# Patient Record
Sex: FEMALE | Race: OTHER | Employment: UNEMPLOYED | URBAN - METROPOLITAN AREA
[De-identification: names, ages, dates, MRNs, and addresses within clinical notes are randomized per-mention and may not be internally consistent; named-entity substitution may affect disease eponyms.]

---

## 2021-11-17 ENCOUNTER — HOSPITAL ENCOUNTER (OUTPATIENT)
Dept: WOUND CARE | Age: 21
Discharge: HOME | End: 2021-11-17

## 2021-11-17 VITALS
TEMPERATURE: 97.8 F | DIASTOLIC BLOOD PRESSURE: 77 MMHG | HEART RATE: 86 BPM | RESPIRATION RATE: 18 BRPM | SYSTOLIC BLOOD PRESSURE: 116 MMHG

## 2021-11-17 DIAGNOSIS — S61.219A: ICD-10-CM

## 2021-11-17 PROCEDURE — 99203 OFFICE O/P NEW LOW 30 MIN: CPT | Performed by: SURGERY

## 2021-11-17 PROCEDURE — 99213 OFFICE O/P EST LOW 20 MIN: CPT

## 2021-11-17 RX ORDER — LIDOCAINE HYDROCHLORIDE 20 MG/ML
JELLY TOPICAL ONCE
Status: CANCELLED | OUTPATIENT
Start: 2021-11-17 | End: 2021-11-17

## 2021-11-17 RX ORDER — BETAMETHASONE DIPROPIONATE 0.05 %
OINTMENT (GRAM) TOPICAL ONCE
Status: CANCELLED | OUTPATIENT
Start: 2021-11-17 | End: 2021-11-17

## 2021-11-17 RX ORDER — GENTAMICIN SULFATE 1 MG/G
OINTMENT TOPICAL ONCE
Status: CANCELLED | OUTPATIENT
Start: 2021-11-17 | End: 2021-11-17

## 2021-11-17 RX ORDER — LIDOCAINE HYDROCHLORIDE 40 MG/ML
SOLUTION TOPICAL ONCE
Status: CANCELLED | OUTPATIENT
Start: 2021-11-17 | End: 2021-11-17

## 2021-11-17 RX ORDER — LIDOCAINE 40 MG/G
CREAM TOPICAL ONCE
Status: CANCELLED | OUTPATIENT
Start: 2021-11-17 | End: 2021-11-17

## 2021-11-17 RX ORDER — GINSENG 100 MG
CAPSULE ORAL ONCE
Status: CANCELLED | OUTPATIENT
Start: 2021-11-17 | End: 2021-11-17

## 2021-11-17 RX ORDER — BACITRACIN, NEOMYCIN, POLYMYXIN B 400; 3.5; 5 [USP'U]/G; MG/G; [USP'U]/G
OINTMENT TOPICAL ONCE
Status: CANCELLED | OUTPATIENT
Start: 2021-11-17 | End: 2021-11-17

## 2021-11-17 RX ORDER — CEPHALEXIN 500 MG/1
TABLET ORAL
COMMUNITY
Start: 2021-11-12

## 2021-11-17 RX ORDER — CLOBETASOL PROPIONATE 0.5 MG/G
OINTMENT TOPICAL ONCE
Status: CANCELLED | OUTPATIENT
Start: 2021-11-17 | End: 2021-11-17

## 2021-11-17 RX ORDER — LIDOCAINE 50 MG/G
OINTMENT TOPICAL ONCE
Status: CANCELLED | OUTPATIENT
Start: 2021-11-17 | End: 2021-11-17

## 2021-11-17 RX ORDER — BACITRACIN ZINC AND POLYMYXIN B SULFATE 500; 1000 [USP'U]/G; [USP'U]/G
OINTMENT TOPICAL ONCE
Status: CANCELLED | OUTPATIENT
Start: 2021-11-17 | End: 2021-11-17

## 2021-11-17 ASSESSMENT — PAIN DESCRIPTION - PAIN TYPE: TYPE: ACUTE PAIN

## 2021-11-17 ASSESSMENT — PAIN SCALES - GENERAL: PAINLEVEL_OUTOF10: 8

## 2021-11-17 ASSESSMENT — PAIN DESCRIPTION - ORIENTATION: ORIENTATION: LEFT

## 2021-11-17 ASSESSMENT — PAIN DESCRIPTION - LOCATION: LOCATION: FINGER (COMMENT WHICH ONE)

## 2021-11-17 ASSESSMENT — PAIN DESCRIPTION - DESCRIPTORS: DESCRIPTORS: BURNING

## 2021-11-17 NOTE — PLAN OF CARE
Problem: Pain:  Goal: Pain level will decrease  Outcome: Ongoing  Goal: Control of acute pain  Outcome: Ongoing  Goal: Control of chronic pain  Outcome: Ongoing     Problem: Wound:  Goal: Will show signs of wound healing; wound closure and no evidence of infection  Outcome: Ongoing

## 2021-11-17 NOTE — PROGRESS NOTES
Cassandra Rodas [address]                                                   Progress Note and Procedure Note      Wanda Thurston  MEDICAL RECORD NUMBER:  [id number]  AGE: 22 y.o. GENDER: female  : 2000  EPISODE DATE:  2021    Subjective:     Chief Complaint   Patient presents with    Wound Check     left fifth finger wound         HISTORY of PRESENT ILLNESS HPI     Wanda Thurston is a 22 y.o. female who presents today for wound/ulcer evaluation. History of Wound Context: Patient had lacerated her left 5th finger at home on . Patient seen at urgent care. No fever or chills. Patient with difficulty moving middle and distal phalanx due to pain. Wound/Ulcer Pain Timing/Severity: constant  Quality of pain: aching  Severity:  7 / 10   Modifying Factors: Pain worsens with movement  Associated Signs/Symptoms: none    Ulcer Identification:  Ulcer Type: traumatic  Contributing Factors: none    Wound: Laceration        PAST MEDICAL HISTORY    History reviewed. No pertinent past medical history. PAST SURGICAL HISTORY    Past Surgical History:   Procedure Laterality Date    TONSILLECTOMY         FAMILY HISTORY    History reviewed. No pertinent family history. SOCIAL HISTORY    Social History     Tobacco Use    Smoking status: Never Smoker    Smokeless tobacco: Never Used   Vaping Use    Vaping Use: Never used   Substance Use Topics    Alcohol use: Never    Drug use: Never       ALLERGIES    No Known Allergies    MEDICATIONS    Current Outpatient Medications on File Prior to Encounter   Medication Sig Dispense Refill    Cephalexin 500 MG TABS Take by mouth       No current facility-administered medications on file prior to encounter.        REVIEW OF SYSTEMS    Constitutional: negative  Respiratory: negative  Cardiovascular: negative  Allergic/Immunologic: negative    Objective:      /77   Pulse 86   Temp 97.8 °F (36.6 °C) (Temporal)   Resp 18     Wt Readings from Last 3 Encounters:   No data found for Wt       PHYSICAL EXAM    Constitutional:   Well nourished and well developed. Appears neat and clean. Patient is alert, oriented x3, and in no apparent distress. Respiratory:  Respiratory effort is easy and symmetric bilaterally. Rate is normal at rest and on room air. Vascular: 2+ left radial.    Dermatological:  Wound description noted in wound assessment. No tendon exposed. Psychiatric:  Judgement and insight intact. Short and long term memory intact. No evidence of depression, anxiety, or agitation. Patient is calm, cooperative, and communicative. Appropriate interactions and affect. Assessment:      Active Hospital Problems    Diagnosis Date Noted    Laceration of finger, left, complicated, initial encounter Tasia Cordoba 11/17/2021      Wound/Ulcer Descriptions are Pre Debridement except measurements:    Wound 11/17/21 Finger (Comment which one) Left #1  (Active)   Wound Image   11/17/21 0957   Wound Etiology Traumatic 11/17/21 0957   Wound Cleansed Cleansed with saline 11/17/21 0957   Dressing/Treatment Xeroform; Dry dressing 11/17/21 1017   Wound Length (cm) 3 cm 11/17/21 0957   Wound Width (cm) 0.5 cm 11/17/21 0957   Wound Depth (cm) 0.2 cm 11/17/21 0957   Wound Surface Area (cm^2) 1.5 cm^2 11/17/21 0957   Wound Volume (cm^3) 0.3 cm^3 11/17/21 0957   Wound Assessment Pink/red 11/17/21 0957   Drainage Amount Small 11/17/21 0957   Drainage Description Sanguinous 11/17/21 0957   Odor None 11/17/21 0957   Patricia-wound Assessment Intact 11/17/21 0957   Margins Defined edges 11/17/21 0957   Wound Thickness Description not for Pressure Injury Full thickness 11/17/21 0957   Number of days: 0              Plan:     Dressing changes as below. Patient understands that he needs to exercise her 5th digit to prevent developing a contracture.       Treatment Note please see attached Discharge Instructions    Written patient dismissal instructions given to patient and signed by patient or POA. Discharge [address] and [facility]   Physician Orders and Discharge Instructions  [address]  Telephone: [phone]      [facility] [phone]      NAME:  Wanda Thurston  YOB: 2000  MEDICAL RECORD NUMBER:  [id number]    Home Care/Facility: None    Wound Location: Left Fifth Finger    Dressing orders:Cleanse with mild soap and water  1. Apply Xeroform to wound bed  2. Cover with dry dressing  3. Change Daily    Compression:    Offloading Device:    Other Instructions: Flex and Bend finger as much as possible. Watch for signs of Infection (increased swelling, Purulent drainage, redness surrounding wound) go to the ER. Keep all dressings clean, dry and intact. Keep pressure off the wound(s) at all times. Follow up visit  2  Weeks December 1, 2021 @ 09:00 am     Please give 24 hour notice if unable to keep appointment. [phone]    If you experience any of the following, please call the Wound Care Service at  [phone] or go to the nearest emergency room. *Increase in pain *Temperature over 101 *Increase in drainage from your wound or a foul odor  *Uncontrolled swelling *Need for compression bandage changes due to slippage, breakthrough drainage       PLEASE NOTE: IF YOU ARE UNABLE TO OBTAIN WOUND SUPPLIES, CONTINUE TO USE THE SUPPLIES YOU HAVE AVAILABLE UNTIL YOU ARE ABLE TO REACH US.  IT IS MOST IMPORTANT TO KEEP THE WOUND COVERED AT ALL TIMES          Electronically signed by Norma Rolle MD on 11/17/2021 at 10:14 AM            Electronically signed by Norma Rolle MD on 11/17/2021 at 10:14 AM

## 2021-12-01 ENCOUNTER — HOSPITAL ENCOUNTER (OUTPATIENT)
Dept: WOUND CARE | Age: 21
Discharge: HOME | End: 2021-12-01

## 2021-12-01 VITALS
DIASTOLIC BLOOD PRESSURE: 72 MMHG | HEART RATE: 65 BPM | RESPIRATION RATE: 16 BRPM | TEMPERATURE: 97.7 F | SYSTOLIC BLOOD PRESSURE: 103 MMHG

## 2021-12-01 DIAGNOSIS — S61.219A: Primary | ICD-10-CM

## 2021-12-01 PROCEDURE — 99212 OFFICE O/P EST SF 10 MIN: CPT | Performed by: SURGERY

## 2021-12-01 PROCEDURE — 99212 OFFICE O/P EST SF 10 MIN: CPT

## 2021-12-01 RX ORDER — CLOBETASOL PROPIONATE 0.5 MG/G
OINTMENT TOPICAL ONCE
Status: CANCELLED | OUTPATIENT
Start: 2021-12-01 | End: 2021-12-01

## 2021-12-01 RX ORDER — LIDOCAINE HYDROCHLORIDE 20 MG/ML
JELLY TOPICAL ONCE
Status: CANCELLED | OUTPATIENT
Start: 2021-12-01 | End: 2021-12-01

## 2021-12-01 RX ORDER — BETAMETHASONE DIPROPIONATE 0.05 %
OINTMENT (GRAM) TOPICAL ONCE
Status: CANCELLED | OUTPATIENT
Start: 2021-12-01 | End: 2021-12-01

## 2021-12-01 RX ORDER — BACITRACIN ZINC AND POLYMYXIN B SULFATE 500; 1000 [USP'U]/G; [USP'U]/G
OINTMENT TOPICAL ONCE
Status: CANCELLED | OUTPATIENT
Start: 2021-12-01 | End: 2021-12-01

## 2021-12-01 RX ORDER — GINSENG 100 MG
CAPSULE ORAL ONCE
Status: CANCELLED | OUTPATIENT
Start: 2021-12-01 | End: 2021-12-01

## 2021-12-01 RX ORDER — BACITRACIN, NEOMYCIN, POLYMYXIN B 400; 3.5; 5 [USP'U]/G; MG/G; [USP'U]/G
OINTMENT TOPICAL ONCE
Status: CANCELLED | OUTPATIENT
Start: 2021-12-01 | End: 2021-12-01

## 2021-12-01 RX ORDER — LIDOCAINE 40 MG/G
CREAM TOPICAL ONCE
Status: CANCELLED | OUTPATIENT
Start: 2021-12-01 | End: 2021-12-01

## 2021-12-01 RX ORDER — LIDOCAINE HYDROCHLORIDE 40 MG/ML
SOLUTION TOPICAL ONCE
Status: CANCELLED | OUTPATIENT
Start: 2021-12-01 | End: 2021-12-01

## 2021-12-01 RX ORDER — LIDOCAINE 50 MG/G
OINTMENT TOPICAL ONCE
Status: CANCELLED | OUTPATIENT
Start: 2021-12-01 | End: 2021-12-01

## 2021-12-01 RX ORDER — GENTAMICIN SULFATE 1 MG/G
OINTMENT TOPICAL ONCE
Status: CANCELLED | OUTPATIENT
Start: 2021-12-01 | End: 2021-12-01

## 2021-12-01 NOTE — PROGRESS NOTES
Cassandra Rodas [address]                                                   Progress Note and Procedure Note      Wanda Thurston  MEDICAL RECORD NUMBER:  [id number]  AGE: 22 y.o. GENDER: female  : 2000  EPISODE DATE:  2021    Subjective:     Chief Complaint   Patient presents with    Wound Check     L 5th finger wound recheck         HISTORY of PRESENT ILLNESS HPI     Wanda Thurston is a 22 y.o. female who presents today for wound/ulcer evaluation. History of Wound Context: Patient had lacerated her left 5th finger at home on . Patient seen at urgent care. No fever or chills. Patient with difficulty moving middle and distal phalanx due to pain. Wound/Ulcer Pain Timing/Severity: constant  Quality of pain: aching  Severity:  7 / 10   Modifying Factors: Pain worsens with movement  Associated Signs/Symptoms: none    Ulcer Identification:  Ulcer Type: traumatic  Contributing Factors: none    Wound: Laceration        PAST MEDICAL HISTORY    History reviewed. No pertinent past medical history. PAST SURGICAL HISTORY    Past Surgical History:   Procedure Laterality Date    TONSILLECTOMY         FAMILY HISTORY    History reviewed. No pertinent family history. SOCIAL HISTORY    Social History     Tobacco Use    Smoking status: Never Smoker    Smokeless tobacco: Never Used   Vaping Use    Vaping Use: Never used   Substance Use Topics    Alcohol use: Never    Drug use: Never       ALLERGIES    No Known Allergies    MEDICATIONS    Current Outpatient Medications on File Prior to Encounter   Medication Sig Dispense Refill    Cephalexin 500 MG TABS Take by mouth       No current facility-administered medications on file prior to encounter.        REVIEW OF SYSTEMS    Constitutional: negative  Respiratory: negative  Cardiovascular: negative  Allergic/Immunologic: negative    Objective:      /72   Pulse 65   Temp 97.7 °F (36.5 °C) (Temporal)   Resp 16     Wt Readings from Last 3 Encounters:   No data found for Wt       PHYSICAL EXAM    Constitutional:   Well nourished and well developed. Appears neat and clean. Patient is alert, oriented x3, and in no apparent distress. Respiratory:  Respiratory effort is easy and symmetric bilaterally. Rate is normal at rest and on room air. Vascular: 2+ left radial.    Dermatological:  Ulcer healed. Psychiatric:  Judgement and insight intact. Short and long term memory intact. No evidence of depression, anxiety, or agitation. Patient is calm, cooperative, and communicative. Appropriate interactions and affect. Assessment:      Active Hospital Problems    Diagnosis Date Noted    Laceration of finger, left, complicated, initial encounter Karina Velásquez 11/17/2021      Wound/Ulcer Descriptions are Pre Debridement except measurements:    Wound 11/17/21 Finger (Comment which one) Left #1  (Active)   Wound Image   12/01/21 0916   Wound Etiology Traumatic 12/01/21 0916   Wound Cleansed Cleansed with saline 11/17/21 0957   Dressing/Treatment Xeroform; Dry dressing 11/17/21 1017   Wound Length (cm) 0 cm 12/01/21 0916   Wound Width (cm) 0 cm 12/01/21 0916   Wound Depth (cm) 0 cm 12/01/21 0916   Wound Surface Area (cm^2) 0 cm^2 12/01/21 0916   Change in Wound Size % (l*w) 100 12/01/21 0916   Wound Volume (cm^3) 0 cm^3 12/01/21 0916   Wound Healing % 100 12/01/21 0916   Wound Assessment Pink/red 11/17/21 0957   Drainage Amount None 12/01/21 0916   Drainage Description Sanguinous 11/17/21 0957   Odor None 11/17/21 0957   Patricia-wound Assessment Intact 11/17/21 0957   Margins Defined edges 11/17/21 0957   Wound Thickness Description not for Pressure Injury Full thickness 12/01/21 0916   Number of days: 13              Plan:   Ulcer healed. F/u prn. Treatment Note please see attached Discharge Instructions    Written patient dismissal instructions given to patient and signed by patient or POA.          Discharge Instructions       Wound Clinic Physician Orders and Discharge [address] [facility] [address]   [place], [address] [place]  Telephone: [phone]    NAME:  Wanda Thurston  YOB: 2000  MEDICAL RECORD NUMBER:  [id number]  DATE:  12/1/2021    Congratulations!! You have completed your treatment. 1. Return to your Primary Care Physician for all your health issues. 2. Resume your ordinary activities as tolerated. 3. Take your medications as prescribed by your primary care physician. 4. Check your skin daily for cracks, bruises, sores, or dryness. Use a moisturizer as needed. 5. Clean and dry your skin, using mild soap and warm water (not hot). 6. Avoid alcohol and caffeine and do not smoke. 7. Maintain a nutritious diet. 8. Avoid pressure on your wound site. Keep your legs elevated above the level of the heart whenever possible. 9. Continue to use wraps/stockings/compression as prescribed. 10. Replace compression stockings every four to six months as needed to ensure proper fit. 11. Wear well-fitting shoes and leg garments. THANK YOU FOR ALLOWING US TO SERVE YOU.  PLEASE CALL IF YOU DEVELOP ANOTHER WOUND. [phone]      Electronically signed by Dorene Nam MD on 12/1/2021 at 9:34 AM             Electronically signed by Dorene Nam MD on 12/1/2021 at 9:34 AM

## 2024-07-15 ENCOUNTER — OFFICE VISIT (OUTPATIENT)
Dept: ORTHOPEDIC SURGERY | Facility: CLINIC | Age: 24
End: 2024-07-15

## 2024-07-15 ENCOUNTER — HOSPITAL ENCOUNTER (OUTPATIENT)
Dept: RADIOLOGY | Facility: CLINIC | Age: 24
Discharge: HOME | End: 2024-07-15

## 2024-07-15 VITALS — BODY MASS INDEX: 21.6 KG/M2 | HEIGHT: 60 IN | WEIGHT: 110 LBS

## 2024-07-15 DIAGNOSIS — M25.562 ACUTE PAIN OF LEFT KNEE: ICD-10-CM

## 2024-07-15 DIAGNOSIS — S80.00XA CONTUSION OF KNEE, INITIAL ENCOUNTER: ICD-10-CM

## 2024-07-15 DIAGNOSIS — S83.92XA SPRAIN OF LEFT KNEE, INITIAL ENCOUNTER: Primary | ICD-10-CM

## 2024-07-15 PROCEDURE — 99203 OFFICE O/P NEW LOW 30 MIN: CPT | Performed by: INTERNAL MEDICINE

## 2024-07-15 PROCEDURE — 73562 X-RAY EXAM OF KNEE 3: CPT | Mod: LEFT SIDE | Performed by: INTERNAL MEDICINE

## 2024-07-15 PROCEDURE — 73562 X-RAY EXAM OF KNEE 3: CPT | Mod: LT

## 2024-07-15 PROCEDURE — 99213 OFFICE O/P EST LOW 20 MIN: CPT | Performed by: INTERNAL MEDICINE

## 2024-07-15 PROCEDURE — 1036F TOBACCO NON-USER: CPT | Performed by: INTERNAL MEDICINE

## 2024-07-15 ASSESSMENT — PATIENT HEALTH QUESTIONNAIRE - PHQ9
1. LITTLE INTEREST OR PLEASURE IN DOING THINGS: NOT AT ALL
2. FEELING DOWN, DEPRESSED OR HOPELESS: NOT AT ALL
SUM OF ALL RESPONSES TO PHQ9 QUESTIONS 1 AND 2: 0

## 2024-07-15 NOTE — PROGRESS NOTES
Acute Injury New Patient Visit    CC:   Chief Complaint   Patient presents with    Left Knee - Pain     Patient was doing cardio exercises and the wt fell on her left knee on 07/12/24.  Xray today 3 V       HPI: Tran is a 24 y.o. female presents today for evaluation for acute left knee injury sustained last week Friday. She states that she was exercising when a weight fell onto her left knee and she fell and twisted her knee. She is here for initial evaluation and x-rays.        Review of Systems   GENERAL: Negative for malaise, significant weight loss, fever  MUSCULOSKELETAL: See HPI  NEURO:  Negative for numbness / tingling     Past Medical History  No past medical history on file.    Medication review  Medication Documentation Review Audit    **Prior to Admission medications have not yet been reviewed**         Allergies  No Known Allergies    Social History  Social History     Socioeconomic History    Marital status: Single     Spouse name: Not on file    Number of children: Not on file    Years of education: Not on file    Highest education level: Not on file   Occupational History    Not on file   Tobacco Use    Smoking status: Never    Smokeless tobacco: Never   Substance and Sexual Activity    Alcohol use: Yes     Comment: Occassionally    Drug use: Yes     Types: Marijuana    Sexual activity: Not on file   Other Topics Concern    Not on file   Social History Narrative    Not on file     Social Determinants of Health     Financial Resource Strain: Not on file   Food Insecurity: Not on file   Transportation Needs: Not on file   Physical Activity: Not on file   Stress: Not on file   Social Connections: Not on file   Intimate Partner Violence: Not on file   Housing Stability: Not on file       Surgical History  No past surgical history on file.    Physical Exam:  GENERAL:  Patient is awake, alert, and oriented to person place and time.  Patient appears well nourished and well kept.  Affect Calm, Not  Acutely Distressed.  HEENT:  Normocephalic, Atraumatic, EOMI  CARDIOVASCULAR:  Hemodynamically stable.  RESPIRATORY:  Normal respirations with unlabored breathing.  Extremity: Left knee examination shows skin is intact.  There is no erythema or warmth.  No effusion.  Can flex the left knee to 130 degrees.  Full extension at 0 degrees.  No pain over the medial joint line.  No pain over the lateral joint line.  There is no pain over the patellar or quadricep tendon.  No pain over the proximal tibia.  No pain over the popliteal fossa.  Negative valgus stress test.  Positive varus stress test with slight instability.  Negative Chon's test medially with no instability.  Negative Chon's test laterally with no instability.  Negative Lachman's test.  Patellar and quadricep mechanism intact.  Negative anterior and posterior drawer test.  Negative patellar apprehension test.  Distal pulses are palpable, neurovascularly intact.  Walking with no significant antalgic gait.      Diagnostics: X-rays reviewed  No image results found.      Procedure: None    Assessment: Left knee sprain and contusion    Plan: Tran presents today for initial evaluation for acute left knee injury sustained three days ago. X-rays showed no obvious fractures.  She is history and physical examination consistent with left knee sprain/contusion, low-grade LCL sprain, we recommended a hinge knee brace for support and physical therapy. She will follow-up in 3-4 weeks for reevaluation.  If no improvement may consider further imaging.    Orders Placed This Encounter    XR knee left 3 views      At the conclusion of the visit there were no further questions by the patient/family regarding their plan of care.  Patient was instructed to call or return with any issues, questions, or concerns regarding their injury and/or treatment plan described above.     07/15/24 at 3:10 PM - Holland Washington MD  Scribe Attestation  By signing my name below, Renan HILL  Fili Carlin   attest that this documentation has been prepared under the direction and in the presence of Holland Washington MD.    Office: [phone]    This note was prepared using voice recognition software.  The details of this note are correct and have been reviewed, and corrected to the best of my ability.  Some grammatical errors may persist related to the Dragon software.

## 2024-08-08 ENCOUNTER — APPOINTMENT (OUTPATIENT)
Dept: ORTHOPEDIC SURGERY | Facility: CLINIC | Age: 24
End: 2024-08-08

## 2025-02-03 PROCEDURE — 87491 CHLMYD TRACH DNA AMP PROBE: CPT

## 2025-02-03 PROCEDURE — 87591 N.GONORRHOEAE DNA AMP PROB: CPT

## 2025-02-03 PROCEDURE — 87661 TRICHOMONAS VAGINALIS AMPLIF: CPT

## 2025-02-04 ENCOUNTER — LAB REQUISITION (OUTPATIENT)
Dept: LAB | Facility: HOSPITAL | Age: 25
End: 2025-02-04

## 2025-02-04 DIAGNOSIS — Z3A.11 11 WEEKS GESTATION OF PREGNANCY (HHS-HCC): ICD-10-CM

## 2025-02-04 DIAGNOSIS — N91.2 AMENORRHEA, UNSPECIFIED: ICD-10-CM

## 2025-02-04 PROCEDURE — 87389 HIV-1 AG W/HIV-1&-2 AB AG IA: CPT

## 2025-02-04 PROCEDURE — 85027 COMPLETE CBC AUTOMATED: CPT

## 2025-02-04 PROCEDURE — 85027 COMPLETE CBC AUTOMATED: CPT | Mod: OUT | Performed by: OBSTETRICS & GYNECOLOGY

## 2025-02-04 PROCEDURE — 80307 DRUG TEST PRSMV CHEM ANLYZR: CPT

## 2025-02-04 PROCEDURE — 86780 TREPONEMA PALLIDUM: CPT | Mod: OUT | Performed by: OBSTETRICS & GYNECOLOGY

## 2025-02-04 PROCEDURE — 87340 HEPATITIS B SURFACE AG IA: CPT

## 2025-02-04 PROCEDURE — 86900 BLOOD TYPING SEROLOGIC ABO: CPT

## 2025-02-04 PROCEDURE — 87086 URINE CULTURE/COLONY COUNT: CPT

## 2025-02-04 PROCEDURE — 86317 IMMUNOASSAY INFECTIOUS AGENT: CPT | Mod: OUT | Performed by: OBSTETRICS & GYNECOLOGY

## 2025-02-04 PROCEDURE — 86901 BLOOD TYPING SEROLOGIC RH(D): CPT

## 2025-02-04 PROCEDURE — 87340 HEPATITIS B SURFACE AG IA: CPT | Mod: OUT | Performed by: OBSTETRICS & GYNECOLOGY

## 2025-02-04 PROCEDURE — 86803 HEPATITIS C AB TEST: CPT

## 2025-02-04 PROCEDURE — 87086 URINE CULTURE/COLONY COUNT: CPT | Mod: OUT | Performed by: OBSTETRICS & GYNECOLOGY

## 2025-02-04 PROCEDURE — 83036 HEMOGLOBIN GLYCOSYLATED A1C: CPT

## 2025-02-04 PROCEDURE — 86803 HEPATITIS C AB TEST: CPT | Mod: OUT | Performed by: OBSTETRICS & GYNECOLOGY

## 2025-02-04 PROCEDURE — 87389 HIV-1 AG W/HIV-1&-2 AB AG IA: CPT | Mod: OUT | Performed by: OBSTETRICS & GYNECOLOGY

## 2025-02-04 PROCEDURE — 86850 RBC ANTIBODY SCREEN: CPT

## 2025-02-04 PROCEDURE — 86317 IMMUNOASSAY INFECTIOUS AGENT: CPT

## 2025-02-04 PROCEDURE — 86850 RBC ANTIBODY SCREEN: CPT | Mod: OUT | Performed by: OBSTETRICS & GYNECOLOGY

## 2025-02-04 PROCEDURE — 83036 HEMOGLOBIN GLYCOSYLATED A1C: CPT | Mod: OUT | Performed by: OBSTETRICS & GYNECOLOGY

## 2025-02-04 PROCEDURE — 86780 TREPONEMA PALLIDUM: CPT

## 2025-02-05 ENCOUNTER — LAB REQUISITION (OUTPATIENT)
Dept: LAB | Facility: HOSPITAL | Age: 25
End: 2025-02-05

## 2025-02-05 DIAGNOSIS — Z12.4 ENCOUNTER FOR SCREENING FOR MALIGNANT NEOPLASM OF CERVIX: ICD-10-CM

## 2025-02-06 LAB
ABO GROUP (TYPE) IN BLOOD: NORMAL
AMPHETAMINES UR QL SCN: NORMAL
ANTIBODY SCREEN: NORMAL
BACTERIA UR CULT: NO GROWTH
BARBITURATES UR QL SCN: NORMAL
BENZODIAZ UR QL SCN: NORMAL
BZE UR QL SCN: NORMAL
C TRACH RRNA SPEC QL NAA+PROBE: NEGATIVE
CANNABINOIDS UR QL SCN: NORMAL
ERYTHROCYTE [DISTWIDTH] IN BLOOD BY AUTOMATED COUNT: 12.6 % (ref 11.5–14.5)
EST. AVERAGE GLUCOSE BLD GHB EST-MCNC: 91 MG/DL
FENTANYL+NORFENTANYL UR QL SCN: NORMAL
HBA1C MFR BLD: 4.8 %
HBV SURFACE AG SERPL QL IA: NONREACTIVE
HCT VFR BLD AUTO: 40.5 % (ref 36–46)
HCV AB SER QL: NONREACTIVE
HGB BLD-MCNC: 13.6 G/DL (ref 12–16)
HIV 1+2 AB+HIV1 P24 AG SERPL QL IA: NONREACTIVE
HOLD SPECIMEN: NORMAL
MCH RBC QN AUTO: 33.3 PG (ref 26–34)
MCHC RBC AUTO-ENTMCNC: 33.6 G/DL (ref 32–36)
MCV RBC AUTO: 99 FL (ref 80–100)
METHADONE UR QL SCN: NORMAL
N GONORRHOEA DNA SPEC QL PROBE+SIG AMP: NEGATIVE
NRBC BLD-RTO: 0 /100 WBCS (ref 0–0)
OPIATES UR QL SCN: NORMAL
OXYCODONE+OXYMORPHONE UR QL SCN: NORMAL
PCP UR QL SCN: NORMAL
PLATELET # BLD AUTO: 310 X10*3/UL (ref 150–450)
RBC # BLD AUTO: 4.08 X10*6/UL (ref 4–5.2)
REFLEX ADDED, ANEMIA PANEL: NORMAL
RH FACTOR (ANTIGEN D): NORMAL
RUBV IGG SERPL IA-ACNC: 1.7 IA
RUBV IGG SERPL QL IA: POSITIVE
T VAGINALIS RRNA SPEC QL NAA+PROBE: NEGATIVE
TREPONEMA PALLIDUM IGG+IGM AB [PRESENCE] IN SERUM OR PLASMA BY IMMUNOASSAY: NONREACTIVE
WBC # BLD AUTO: 9.2 X10*3/UL (ref 4.4–11.3)

## 2025-02-10 LAB
CYTOLOGY CMNT CVX/VAG CYTO-IMP: NORMAL
LAB AP HPV GENOTYPE QUESTION: NO
LAB AP HPV HR: NORMAL
LAB AP PAP ADDITIONAL TESTS: NORMAL
LABORATORY COMMENT REPORT: NORMAL
LMP START DATE: NORMAL
MENSTRUAL HX REPORTED: NORMAL
PATH REPORT.TOTAL CANCER: NORMAL

## 2025-03-10 ENCOUNTER — TELEPHONE (OUTPATIENT)
Facility: CLINIC | Age: 25
End: 2025-03-10

## 2025-03-10 ENCOUNTER — APPOINTMENT (OUTPATIENT)
Facility: CLINIC | Age: 25
End: 2025-03-10
Payer: MEDICAID

## 2025-03-10 ENCOUNTER — LAB (OUTPATIENT)
Dept: LAB | Facility: HOSPITAL | Age: 25
End: 2025-03-10
Payer: MEDICAID

## 2025-03-10 VITALS — WEIGHT: 113.6 LBS | BODY MASS INDEX: 22.19 KG/M2 | SYSTOLIC BLOOD PRESSURE: 106 MMHG | DIASTOLIC BLOOD PRESSURE: 67 MMHG

## 2025-03-10 DIAGNOSIS — Z86.69 H/O MIGRAINE DURING PREGNANCY: ICD-10-CM

## 2025-03-10 DIAGNOSIS — Z87.59 H/O MIGRAINE DURING PREGNANCY: ICD-10-CM

## 2025-03-10 DIAGNOSIS — Z34.02 ENCOUNTER FOR SUPERVISION OF NORMAL FIRST PREGNANCY, SECOND TRIMESTER: Primary | ICD-10-CM

## 2025-03-10 DIAGNOSIS — Z34.02 ENCOUNTER FOR PRENATAL CARE IN SECOND TRIMESTER OF FIRST PREGNANCY (HHS-HCC): Primary | ICD-10-CM

## 2025-03-10 PROBLEM — Z3A.16 16 WEEKS GESTATION OF PREGNANCY (HHS-HCC): Status: ACTIVE | Noted: 2025-03-10

## 2025-03-10 PROCEDURE — 99204 OFFICE O/P NEW MOD 45 MIN: CPT

## 2025-03-10 RX ORDER — METOCLOPRAMIDE 10 MG/1
10 TABLET ORAL EVERY 8 HOURS PRN
Qty: 60 TABLET | Refills: 6 | Status: SHIPPED | OUTPATIENT
Start: 2025-03-10

## 2025-03-10 RX ORDER — ACETAMINOPHEN 500 MG
1000 TABLET ORAL EVERY 8 HOURS PRN
Qty: 60 TABLET | Refills: 6 | Status: SHIPPED | OUTPATIENT
Start: 2025-03-10

## 2025-03-10 NOTE — LETTER
2025    RE: Tran Dhaliwal  : 2000    To Whom It May Concern:    This letter is to certify that,  Tran Dhaliwal is a patient here at [facility].  She is currently pregnant with an estimated due date of 2025     Safe antibiotic should be given if indicated including Penicillin, Cephalosporin and Erythromycin.  Tetracyclin and Sulfa drugs should NOT be given.  Local anesthesia without epinephrine is acceptable.  DO NOT use general anesthesia.  Always shield the abdomen for X-Rays.  Painkillers like Tylenol #3 are safe, but NSAIDS should be avoided.    Patients allergies: fish containing products    If you have any questions, issues or concerns, please contact the office at [phone].    Thank you.      [facility]

## 2025-03-10 NOTE — PROGRESS NOTES
Assessment/Plan   25 y.o.  at 17w0d  - Patient appropriate for and desires midwifery service  - Oriented to practice, including available collaboration and consulting with physicians  - On call phone number provided, discussed reasons for use  - OB Records reviewed, all wnl and up to date  - Reviewed genetic screening options, pt interested in NIPT. Orders placed.  - Order placed for anatomy scan, pt instructed to schedule between 18-20 weeks  - encouraged continued compliance with PNV and bASA  - Routine prenatal care    H/O migraine during pregnancy  -     Referral to Neurology; Future  -     metoclopramide (Reglan) 10 mg tablet; Take 1 tablet (10 mg) by mouth every 8 hours if needed (nausea OR headache).  -     acetaminophen (Tylenol Extra Strength) 500 mg tablet; Take 2 tablets (1,000 mg) by mouth every 8 hours if needed for headaches. Take with Reglan.  -     magnesium aspart,citrate,oxide 400 mg magnesium capsule; Take 1 capsule by mouth once daily as needed (headache).  - Also discussed trying caffeine pill, pt declines this    Follow up in 4 weeks for next prenatal visit. Review results of NIPT and anatomy scan. Discuss and order upcoming GCT/CBC. How are migraines?    Aminah Patino, JESSICA-FIOR    Subjective     Tran Colon is a 25 y.o.  at 17w0d with a working estimated date of delivery of 2025, by Ultrasound presenting for her first prenatal visit with [facility], as a transfer of care from [facility]. She denies vaginal bleeding, leakage of fluid, or cramping/contractions. She does not feel fetal movement yet.     She is dated by a first trimester ultrasound. Sure LMP but history of irregular menstrual cycles. Taking PNV and bASA daily.    FOB is her partner of 7 years, Rohit. Pt gong to school for microblading and Rohit is a cárdenas.     She has been suffering from migraines her entire pregnancy and pre-pregnancy. She states that migraines run in her family (her mom gets them as  well). She tried Tylenol early on but got no relief. She will use a dark eye mask to fall asleep.     Her pregnancy is complicated by:  Pregnancy Problems (from 03/10/25 to present)       Problem Noted Diagnosed Resolved    16 weeks gestation of pregnancy ([facility] 3/10/2025 by NAIN Lowery  No    Priority:  Medium       Overview Addendum 3/10/2025  3:03 PM by NAIN Lowery     Desired provider in labor: [x] CNM  [] Physician   [] Either Acceptable  [x] Blood Products: [x] Yes, accepts [] No, needs counseling  [x] Initial BMI: 19.53   [x] Prenatal Labs: at [facility], all wnl  [x] Cervical Cancer Screening up to date: 2/3/25 wnl  [x] Rh status: Pos  [x] Screen for IPV and Substance Use Risk:  [] Genetic Screening (cfDNA):    [] First Trimester Anatomy Screen (11-13.6 wks):  [x] Baby ASA (initiated):  [x] Pregnancy dated by: 7w3d US (at [facility])    [] Anatomy US: (19-20 wks)  [] Federal Sterilization consent signed (if indicated):  [] 1hr GCT at 24-28wks:  [] Rhogam (if indicated):   [] Fetal Surveillance (if indicated):  [] Tdap (27-32 wks, may be given up to 36 wks if initial window missed):   [] RSV (32-36 wks) (Sept. to end of ):     [] Feeding Intentions:  [] Postpartum Birth control method:   [] GBS at 36 - 37 wks:  [] 39 weeks discussion of IOL vs. Expectant management:  [] Mode of delivery ( anticipated ):                    Objective   Weight: 51.5 kg (113 lb 9.6 oz)  TW.169 kg (13 lb 9.6 oz)   Expected Total Weight Gain: 11.5 kg (25 lb)-16 kg (35 lb)   Pregravid BMI: 19.53  Pregravid Weight: 45.4 kg (100 lb)   BP: 106/67  Fetal Heart Rate: 164

## 2025-03-19 LAB
COMMENTS - MP RESULT TYPE: NORMAL
SCAN RESULT: NORMAL

## 2025-03-31 ENCOUNTER — TELEPHONE (OUTPATIENT)
Facility: CLINIC | Age: 25
End: 2025-03-31
Payer: MEDICAID

## 2025-03-31 ENCOUNTER — HOSPITAL ENCOUNTER (OUTPATIENT)
Dept: RADIOLOGY | Facility: CLINIC | Age: 25
Discharge: HOME | End: 2025-03-31
Payer: MEDICAID

## 2025-03-31 DIAGNOSIS — Z34.02 ENCOUNTER FOR PRENATAL CARE IN SECOND TRIMESTER OF FIRST PREGNANCY: ICD-10-CM

## 2025-03-31 PROBLEM — O28.3 ABNORMAL ULTRASONIC FINDING ON ANTENATAL SCREENING OF MOTHER, ANTEPARTUM: Status: ACTIVE | Noted: 2025-03-31

## 2025-03-31 PROCEDURE — 76805 OB US >/= 14 WKS SNGL FETUS: CPT

## 2025-04-10 ENCOUNTER — APPOINTMENT (OUTPATIENT)
Facility: CLINIC | Age: 25
End: 2025-04-10
Payer: MEDICAID